# Patient Record
Sex: FEMALE | Race: WHITE | NOT HISPANIC OR LATINO | Employment: STUDENT | URBAN - METROPOLITAN AREA
[De-identification: names, ages, dates, MRNs, and addresses within clinical notes are randomized per-mention and may not be internally consistent; named-entity substitution may affect disease eponyms.]

---
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
## 2021-10-11 ENCOUNTER — HOSPITAL ENCOUNTER (EMERGENCY)
Facility: HOSPITAL | Age: 21
End: 2021-10-11

## 2021-10-11 VITALS
BODY MASS INDEX: 39.37 KG/M2 | HEART RATE: 121 BPM | TEMPERATURE: 98.2 F | HEIGHT: 66 IN | DIASTOLIC BLOOD PRESSURE: 96 MMHG | RESPIRATION RATE: 16 BRPM | SYSTOLIC BLOOD PRESSURE: 139 MMHG | OXYGEN SATURATION: 99 % | WEIGHT: 245 LBS

## 2023-04-30 NOTE — PROGRESS NOTES
"Chief Complaint  Depression, anxiety, autism spectrum disorder, ADHD, dyslexia, social anxiety, and auditory processing disorder    Subjective          Pushpa White presents to [facility] with biological mother, Myrna, for an initial evaluation. The patient was a self-referral.    History of Present Illness: Pushpa states, \"my anxiety has gotten really bad.\"  Pushpa tells me that she began experiencing a worsening of depression and anxiety in 2021 after the death of her biological father.  She tells me that they were very close so she is feeling depressed, sad, irritated, and has been isolating herself.  She denies any suicidal ideations but has a history of self harming by digging her fingernails into her hands.  She has not done this in some time and has developed coping skills to manage.  She had previously began psychiatric care with Dr. Ulloa then Dr. Cervantes and Dr. Childers.  She tells me that her anxiety is constant and severe.  She does not feel her coping skills are working as effectively as before.  She wears noise canceling headphones, listens to music, watches TV, plays with her dogs, reads, or fidgets.  Her panic attacks began around age 13.  The last one she had was earlier this morning before her appointment.  She tells me they can last up to 30 minutes.  She feels better when she is in the presence of her mom who will reassure her.  She does not prefer to be touched but likes to tap her mom's hands or previously would rub her ears.  She tells me these occur out of the blue and there are no triggers.  She denies any symptoms of depersonalization or derealization.  She tends to avoid crowds and hospitals or doctors offices.  She reports having flashbacks when she is in hospitals or hears loud noises.  She often takes a long time to fall asleep at night.  She goes to bed around 11 PM and sometimes does not fall asleep until 1 or 3 AM.  If she " wakes, she has trouble going back to sleep.  Melatonin has been putting her to sleep but does not keep her asleep.  She denies any problems with appetite and is trying to expand her palate.  She does note that some textures or smells bother her.  She has an allergy to peanuts and nuts.  She also reports having ADHD symptoms of hyperactivity and restlessness.  She feels her inattentive symptoms are managed by engaging in multiple task at once.  She will also have outburst and scream when she is very excited or hyperactive.  There have been motor tics in the past but none are visualized during the appointment today.  She finds it hard to talk to others and often thinks a lot about what she will say in social situations or ruminates on what others say to her.  She will avoid social situations if possible.  She reports having some OCD symptoms such as unplugging all electronics before leaving the house due to fear of fire.  She also has to make sure that her mother has her phone before she leaves the house or she feels something bad will happen.  She is currently unemployed but is hoping to find a babysitting job or a job working with children as this is something that she enjoys greatly.  She is currently taking BuSpar and hydroxyzine.  She denies any side effects.  She denies any SI/HI/AVH.    Past Psychiatric History: Pushpa tells me that psychiatric care began at age 17.  She started with therapy and then medication management.  She tried Buspar, Hydroxyzine, Zoloft, and Vyvanse.  She began seeing Dr. Ulloa who was also in a practice with Dr. Estes and Dr. Childers.  She saw him for both therapy and medication management.  She denies any inpatient psychiatric hospitalizations or residential treatments.  She denies any suicide attempts or ideations but does report having a history of self-harm around 17.  She will take her fingers into her hands.  She has developed some coping skills to manage.    Substance  "Use/Abuse: Pushpa and her mother adamantly denied the current or former use of any substances such as nicotine/alcohol/illicit drugs.    Family Psychiatric History: Pushpa tells me that her father had a history of depression, anxiety, and OCD.  Her paternal grandmother has a history of depression and OCD.    Developmental History: Pushpa was born in [place], [place] and raised by her biological parents in [place], [place].  She tells me that she did not always enjoy school and began attending a private school in the seventh grade due to having difficulty with change in her intermediate school.  She struggled in private school because it was self-directed and self taught.  She feels she learns more by hands on teaching.  Her mother began allowing her to home school from eighth grade until graduation.  She has a high school diploma.  She has always been good with making and keeping friends.  Her mom identified her as a \"social butterfly\" in the past.  She did get in trouble in school for talking and disrupting others due to her ADHD symptoms.  She denies any disciplinary problems at home but did act out and kick or yell when she was upset.  She notes that it was very difficult growing up because her grandmother lived with them when she was 9-1/2 years old.  She had OCD and would often argue with the patient about how many toys she could play with and making sure the house was clean.  Her grandmother currently lives in a nursing home and can be passive aggressive towards the patient.  She blames her and makes her feel guilty for not doing the things that she says or visiting often enough.  The patient has tried to discuss how she feels about this but her grandmother refuses to listen.    Social History: Pushpa currently lives in [place], [place] with her mother.  She has no siblings.  She has no biological children.  She has not been .  She enjoys spending time with her friend, going to " "[employer], reading, watching TV, and going out to eat.  She also likes to swim.  She has a high school diploma.  She denies any history of alcohol, nicotine, or illicit drug use.    Objective   Vital Signs:   /84   Pulse 87   Ht 170.2 cm (67\")   Wt 120 kg (265 lb)   BMI 41.50 kg/m²       PHQ-9 Score:   PHQ-9 Total Score: 21     ERIC-7 Score:  ERIC 7 Total Score: 21    Mental Status Exam:   Hygiene:   good  Cooperation:  Guarded  Eye Contact:  Fair  Psychomotor Behavior:  Restless  Affect:  Restricted  Mood: anxious  Speech:  Normal  Thought Process:  Linear  Thought Content:  Normal  Suicidal:  None  Homicidal:  None  Hallucinations:  None  Delusion:  None  Memory:  Intact  Orientation:  Person, Place, Time and Situation  Reliability:  good  Insight:  Good  Judgement:  Fair  Impulse Control:  Fair  Physical/Medical Issues:  Yes Anemia, asthma, claustrophobia, migraines, hearing loss with a ear tube in the left ear, constipation, and Hirschsprung's disease     Current Medications:   Current Outpatient Medications   Medication Sig Dispense Refill   • busPIRone (BUSPAR) 10 MG tablet Take 1 tablet by mouth 3 (Three) Times a Day. 90 tablet 0   • EPINEPHrine (EPIPEN) 0.3 MG/0.3ML solution auto-injector injection      • Semaglutide-Weight Management (Wegovy) 0.25 MG/0.5ML solution auto-injector Inject 0.25 mg under the skin into the appropriate area as directed.     • SUMAtriptan (IMITREX) 50 MG tablet      • fluvoxaMINE (LUVOX) 50 MG tablet Take 0.5 tablets by mouth Every Night for 14 days, THEN 1 tablet Every Night for 14 days. 21 tablet 0     No current facility-administered medications for this visit.   Physical Exam  Vitals and nursing note reviewed.   Constitutional:       Appearance: Normal appearance. She is well-developed.   Musculoskeletal:         General: Normal range of motion.   Skin:     General: Skin is warm and dry.   Neurological:      Mental Status: She is alert and oriented to person, place, and " time.   Psychiatric:         Attention and Perception: Attention normal.         Mood and Affect: Mood normal.         Speech: Speech normal.         Behavior: Behavior normal. Behavior is cooperative.         Thought Content: Thought content normal.         Cognition and Memory: Cognition normal.         Judgment: Judgment normal.        Result Review :                 Assessment and Plan    Diagnoses and all orders for this visit:    1. Moderate recurrent major depression (Primary)    2. Other obsessive-compulsive disorders  -     fluvoxaMINE (LUVOX) 50 MG tablet; Take 0.5 tablets by mouth Every Night for 14 days, THEN 1 tablet Every Night for 14 days.  Dispense: 21 tablet; Refill: 0    3. Autism spectrum disorder requiring support (level 1)    4. ADHD (attention deficit hyperactivity disorder), combined type    5. ERIC (generalized anxiety disorder)    6. Auditory processing disorder    7. Dyslexia         Impression:  -This is an initial evaluation of the patient. Pushpa is a single, 22-year-old  female who presents with her biological mother for a medication evaluation.  Pushpa has been treated for depression, anxiety, ADHD, dyslexia, social anxiety, auditory processing disorder, and autism spectrum disorder.  She had previously been treated in [place] with Dr. Ulloa's office; however, her previous providers have left the practice and was unable to obtain records according to her mother.  She has not been in psychiatric care in some time and often refuses to go see doctors due to fear, flashbacks, and panic.  She had a panic episode this morning but agreed to meet with me today to discuss a treatment plan.  She is interested in resuming therapy and medication management.  She feels that working on her anxiety would be very important.  She has taken Buspar, Hydroxyzine, Zoloft, and Vyvanse in the past.  We discussed possible medication options include using Prozac or Luvox to treat anxiety and OCD  symptoms.  I explained the mechanism of action and purpose of both medications, as well as risk versus benefits and adverse effects.  She feels the Luvox would be a more appropriate option for her as she does have concerns for weight gain with SSRIs.  She does not feel that her ADHD needs to be treated with medication at this time as she is managing on her own.  She does have goals to try working in the future.  Her best friend is also getting  and she would like to be more social and involved in the wedding.  I suggested that we use an as needed medication as well.  She is currently taking BuSpar so I encouraged her to increase the dose to 20 mg when she takes this medication.  She agrees to try.  I provided options on therapy including information on therapy within our clinic and to other resources in [place], as well as psychologytoday.[email or web address] to conduct an Internet search for a therapist in [place].  He agrees to add therapy to her treatment plan.  -Stop Hydroxyzine due to perceived lack of efficacy.   -Initiate Luvox 25 mg nightly for 2 weeks and increase to 50 mg nightly for OCD, anxiety, and depression.  -Increase BuSpar to 20 mg daily as needed for anxiety.  -Consider therapy.  -She is monitored by PCP and hematology frequently due to anemia and needing iron infusions.     TREATMENT PLAN/GOALS: Continue supportive psychotherapy efforts and medications as indicated. Treatment and medication options discussed during today's visit. Patient ackowledged and verbally consented to continue with current treatment plan and was educated on the importance of compliance with treatment and follow-up appointments.    MEDICATION ISSUES:    We discussed risks, benefits, and side effects of the above medications and the patient was agreeable with the plan. Patient was educated on the importance of compliance with treatment and follow-up appointments.  Patient is agreeable to call the office with any worsening of  symptoms or onset of side effects. Patient is agreeable to call 911 or go to the nearest ER should he/she begin having SI/HI.      Counseled patient regarding multimodal approach with healthy nutrition, healthy sleep, regular physical activity, social activities, counseling, and medications.      Coping skills reviewed and encouraged positive framing of thoughts     Assisted patient in processing above session content; acknowledged and normalized patient's thoughts, feelings, and concerns.  Applied  positive coping skills and behavior management in session.  Allowed patient to freely discuss issues without interruption or judgment. Provided safe, confidential environment to facilitate the development of positive therapeutic relationship and encourage open, honest communication. Assisted patient in identifying risk factors which would indicate the need for higher level of care including thoughts to harm self or others and/or self-harming behavior and encouraged patient to contact this office, call 911, or present to the nearest emergency room should any of these events occur. Discussed crisis intervention services and means to access.     MEDS ORDERED DURING VISIT:  New Medications Ordered This Visit   Medications   • fluvoxaMINE (LUVOX) 50 MG tablet     Sig: Take 0.5 tablets by mouth Every Night for 14 days, THEN 1 tablet Every Night for 14 days.     Dispense:  21 tablet     Refill:  0           Follow Up   Return in about 4 weeks (around 5/30/2023) for Medication Check.    Patient was given instructions and counseling regarding her condition or for health maintenance advice. Please see specific information pulled into the AVS if appropriate.     This document has been electronically signed by BENITEZ Van  May 3, 2023 07:38 EDT      This document has been electronically signed by BENITEZ Frausto, PMHNP-BC  May 3, 2023 07:38 EDT    Part of this note may be an electronic  transcription/translation of spoken language to printed text using the Dragon Dictation System.

## 2023-05-02 ENCOUNTER — OFFICE VISIT (OUTPATIENT)
Dept: PSYCHIATRY | Facility: CLINIC | Age: 23
End: 2023-05-02
Payer: COMMERCIAL

## 2023-05-02 VITALS
HEART RATE: 87 BPM | DIASTOLIC BLOOD PRESSURE: 84 MMHG | BODY MASS INDEX: 41.59 KG/M2 | WEIGHT: 265 LBS | SYSTOLIC BLOOD PRESSURE: 136 MMHG | HEIGHT: 67 IN

## 2023-05-02 DIAGNOSIS — F90.2 ADHD (ATTENTION DEFICIT HYPERACTIVITY DISORDER), COMBINED TYPE: ICD-10-CM

## 2023-05-02 DIAGNOSIS — R48.0 DYSLEXIA: ICD-10-CM

## 2023-05-02 DIAGNOSIS — F33.1 MODERATE RECURRENT MAJOR DEPRESSION: Primary | ICD-10-CM

## 2023-05-02 DIAGNOSIS — F41.1 GAD (GENERALIZED ANXIETY DISORDER): ICD-10-CM

## 2023-05-02 DIAGNOSIS — H93.25 AUDITORY PROCESSING DISORDER: ICD-10-CM

## 2023-05-02 DIAGNOSIS — F42.8 OTHER OBSESSIVE-COMPULSIVE DISORDERS: ICD-10-CM

## 2023-05-02 DIAGNOSIS — F84.0 AUTISM SPECTRUM DISORDER REQUIRING SUPPORT (LEVEL 1): ICD-10-CM

## 2023-05-02 PROCEDURE — 90792 PSYCH DIAG EVAL W/MED SRVCS: CPT | Performed by: NURSE PRACTITIONER

## 2023-05-02 RX ORDER — SEMAGLUTIDE 0.25 MG/.5ML
0.25 INJECTION, SOLUTION SUBCUTANEOUS
COMMUNITY
Start: 2023-04-04

## 2023-05-02 RX ORDER — FLUVOXAMINE MALEATE 50 MG/1
TABLET, COATED ORAL
Qty: 21 TABLET | Refills: 0 | Status: SHIPPED | OUTPATIENT
Start: 2023-05-02 | End: 2023-05-30

## 2023-05-02 RX ORDER — BUSPIRONE HYDROCHLORIDE 10 MG/1
10 TABLET ORAL 3 TIMES DAILY
Qty: 90 TABLET | Refills: 0 | COMMUNITY
Start: 2023-04-04 | End: 2023-05-04

## 2023-05-02 RX ORDER — SUMATRIPTAN 50 MG/1
TABLET, FILM COATED ORAL
COMMUNITY
Start: 2023-04-04

## 2023-05-02 RX ORDER — EPINEPHRINE 0.3 MG/.3ML
INJECTION SUBCUTANEOUS
COMMUNITY
Start: 2023-04-04

## 2023-06-02 DIAGNOSIS — F42.8 OTHER OBSESSIVE-COMPULSIVE DISORDERS: ICD-10-CM

## 2023-06-05 RX ORDER — FLUVOXAMINE MALEATE 100 MG
100 TABLET ORAL NIGHTLY
Qty: 30 TABLET | Refills: 2 | Status: SHIPPED | OUTPATIENT
Start: 2023-06-05

## 2023-06-05 RX ORDER — FLUVOXAMINE MALEATE 50 MG/1
TABLET, COATED ORAL
Qty: 21 TABLET | Refills: 0 | OUTPATIENT
Start: 2023-06-05 | End: 2023-07-03

## 2023-06-05 NOTE — TELEPHONE ENCOUNTER
Okay. I will send in 100 mg and still take at night. Then we can see if the increase is helping at follow up.

## 2023-06-05 NOTE — TELEPHONE ENCOUNTER
Sent through [employer].  Tried to call pt to find out how she was doing on Luvox 50MG, no answer, left vm to call us back at her earliest convenience.

## 2023-06-05 NOTE — TELEPHONE ENCOUNTER
States that it doesn't seem to be helping a whole lot. States still have severe anxiety and not wanting to get out and do anything. Would like to try increasing dose to see if that helps.